# Patient Record
Sex: FEMALE | URBAN - NONMETROPOLITAN AREA
[De-identification: names, ages, dates, MRNs, and addresses within clinical notes are randomized per-mention and may not be internally consistent; named-entity substitution may affect disease eponyms.]

---

## 2020-08-14 ENCOUNTER — OFFICE VISIT (OUTPATIENT)
Dept: FAMILY MEDICINE CLINIC | Age: 20
End: 2020-08-14
Payer: COMMERCIAL

## 2020-08-14 VITALS
HEART RATE: 66 BPM | WEIGHT: 129.2 LBS | OXYGEN SATURATION: 98 % | TEMPERATURE: 99.3 F | SYSTOLIC BLOOD PRESSURE: 102 MMHG | DIASTOLIC BLOOD PRESSURE: 66 MMHG

## 2020-08-14 PROBLEM — L03.032 INFECTION OF NAIL BED OF TOE OF LEFT FOOT: Status: ACTIVE | Noted: 2020-08-14

## 2020-08-14 PROCEDURE — 99211 OFF/OP EST MAY X REQ PHY/QHP: CPT

## 2020-08-14 PROCEDURE — 99213 OFFICE O/P EST LOW 20 MIN: CPT | Performed by: NURSE PRACTITIONER

## 2020-08-14 RX ORDER — NORETHINDRONE AND ETHINYL ESTRADIOL AND FERROUS FUMARATE 0.8-25(24)
KIT ORAL
COMMUNITY
Start: 2020-06-08

## 2020-08-14 RX ORDER — AMOXICILLIN AND CLAVULANATE POTASSIUM 400; 57 MG/5ML; MG/5ML
25 POWDER, FOR SUSPENSION ORAL 2 TIMES DAILY
Qty: 184 ML | Refills: 0 | Status: SHIPPED | OUTPATIENT
Start: 2020-08-14 | End: 2020-08-24

## 2020-08-14 ASSESSMENT — ENCOUNTER SYMPTOMS
ABDOMINAL PAIN: 0
WHEEZING: 0
SHORTNESS OF BREATH: 0
COUGH: 0

## 2020-08-14 NOTE — PROGRESS NOTES
[facility] [address] [address]  Dept: [phone]  Dept Fax: [phone]  Loc: [phone]     Visit Date:  8/14/2020    Patient:  Maxim Reyes  YOB: 2000    HPI:     Chief Complaint   Patient presents with    Toe Pain     just noticed this morning during a bath. unsure of how long it has been like this. denies any pain or remembering doing anything to the foot. HPI   Left foot great toe, nail fold infection, oozing bloody/ pus. Slight swelling and not too tender, wonders if cut nail too low,     no swelling of foot or ankle  No fever            Medications  Prior to Visit Medications    Medication Sig Taking? Authorizing Provider   Norethin-Eth Estradiol-Fe 0.8-25 MG-MCG CHEW CHEW ONE TABLET BY MOUTH DAILY. TAKE CONTINUOUSLY, NO PLACEBO Yes Historical Provider, MD        The patient has No Known Allergies. Past Medical History  Soledad Rolon  has no past medical history on file. Past Surgical History  The patient  has no past surgical history on file. Family History  This patient's family history is not on file. Social History  Lynette  reports that she has never smoked. She has never used smokeless tobacco.    Health Maintenance:    Health Maintenance   Topic Date Due    Varicella vaccine (1 of 2 - 2-dose childhood series) 08/14/1999    HPV vaccine (1 - 2-dose series) 08/14/2009    HIV screen  08/14/2013    Chlamydia screen  08/14/2014    DTaP/Tdap/Td vaccine (1 - Tdap) 08/14/2017    Cervical cancer screen  08/14/2019    Flu vaccine (1) 09/01/2020    Hepatitis A vaccine  Aged Out    Hepatitis B vaccine  Aged Out    Hib vaccine  Aged Out    Meningococcal (ACWY) vaccine  Aged Out    Pneumococcal 0-64 years Vaccine  Aged Out       Subjective:      Review of Systems   Constitutional: Negative for chills, fatigue and fever. HENT: Negative for congestion. Respiratory: Negative for cough, shortness of breath and wheezing. Cardiovascular: Negative for chest pain, palpitations and leg swelling. Gastrointestinal: Negative for abdominal pain. Neurological: Negative for dizziness. Objective:     /66   Pulse 66   Temp 99.3 °F (37.4 °C)   Wt 129 lb 3.2 oz (58.6 kg)   SpO2 98%     Physical Exam  Vitals signs and nursing note reviewed. Constitutional:       Appearance: Normal appearance. Cardiovascular:      Rate and Rhythm: Normal rate and regular rhythm. Heart sounds: S1 normal and S2 normal.   Pulmonary:      Effort: Pulmonary effort is normal.      Breath sounds: Normal breath sounds. Abdominal:      General: Bowel sounds are normal.      Palpations: Abdomen is soft. Musculoskeletal:      Right lower leg: No edema. Left lower leg: No edema. Skin:     General: Skin is warm. Capillary Refill: Capillary refill takes less than 2 seconds. Neurological:      Mental Status: She is alert. Psychiatric:         Attention and Perception: Attention normal.         Behavior: Behavior is cooperative. Judgment: Judgment normal.         Labs Reviewed 8/14/2020:    No results found for: WBC, HGB, HCT, PLT, CHOL, TRIG, HDL, LDLDIRECT, ALT, AST, NA, K, CL, CREATININE, BUN, CO2, TSH, PSA, INR, GLUF, LABA1C, LABMICR    Assessment/Plan      1. Infection of nail bed of toe of left foot  New start Augmentin    supplement with yogurt and dietary probiotics daily     Edmund in 5-7 days if no better      Return in about 1 week (around 8/21/2020), or if symptoms worsen or fail to improve. Patient given educational materials - see patient instructions. Discussed use, benefit, and side effects of prescribed medications. All patient questions answered. Pt voiced understanding. Reviewed health maintenance.        Electronically signed CIELO Dailey CNP on 8/14/2020 at 11:32 AM